# Patient Record
Sex: MALE | Race: BLACK OR AFRICAN AMERICAN | URBAN - METROPOLITAN AREA
[De-identification: names, ages, dates, MRNs, and addresses within clinical notes are randomized per-mention and may not be internally consistent; named-entity substitution may affect disease eponyms.]

---

## 2020-05-18 ENCOUNTER — OFFICE VISIT (OUTPATIENT)
Dept: FAMILY MEDICINE CLINIC | Facility: CLINIC | Age: 55
End: 2020-05-18
Payer: COMMERCIAL

## 2020-05-18 VITALS
RESPIRATION RATE: 17 BRPM | SYSTOLIC BLOOD PRESSURE: 148 MMHG | DIASTOLIC BLOOD PRESSURE: 98 MMHG | WEIGHT: 315 LBS | HEART RATE: 86 BPM | HEIGHT: 74 IN | BODY MASS INDEX: 40.43 KG/M2

## 2020-05-18 DIAGNOSIS — Z00.00 ENCOUNTER FOR PREVENTIVE CARE: ICD-10-CM

## 2020-05-18 DIAGNOSIS — E66.01 MORBID OBESITY WITH BMI OF 40.0-44.9, ADULT (HCC): Primary | ICD-10-CM

## 2020-05-18 DIAGNOSIS — Z12.5 PROSTATE CANCER SCREENING: ICD-10-CM

## 2020-05-18 DIAGNOSIS — I10 ESSENTIAL HYPERTENSION: ICD-10-CM

## 2020-05-18 DIAGNOSIS — R29.818 SUSPECTED SLEEP APNEA: ICD-10-CM

## 2020-05-18 PROCEDURE — 99203 OFFICE O/P NEW LOW 30 MIN: CPT | Performed by: FAMILY MEDICINE

## 2020-05-18 PROCEDURE — 99386 PREV VISIT NEW AGE 40-64: CPT | Performed by: FAMILY MEDICINE

## 2020-05-18 PROCEDURE — 93000 ELECTROCARDIOGRAM COMPLETE: CPT | Performed by: FAMILY MEDICINE

## 2020-05-18 RX ORDER — OLMESARTAN MEDOXOMIL AND HYDROCHLOROTHIAZIDE 40/25 40; 25 MG/1; MG/1
TABLET ORAL
COMMUNITY
Start: 2020-02-06 | End: 2020-05-18

## 2020-05-18 RX ORDER — OLMESARTAN MEDOXOMIL AND HYDROCHLOROTHIAZIDE 40/25 40; 25 MG/1; MG/1
1 TABLET ORAL DAILY
Qty: 90 TABLET | Refills: 3 | Status: SHIPPED | OUTPATIENT
Start: 2020-05-18 | End: 2020-05-18

## 2020-05-18 RX ORDER — OLMESARTAN MEDOXOMIL AND HYDROCHLOROTHIAZIDE 40/25 40; 25 MG/1; MG/1
1 TABLET ORAL DAILY
Qty: 90 TABLET | Refills: 3 | Status: SHIPPED | OUTPATIENT
Start: 2020-05-18 | End: 2021-03-23

## 2020-05-18 NOTE — PROGRESS NOTES
HPI:    Patient ID: Alla Anna is a 53year old male.     Patient is a 58-year-old -American male here to establish care with new physician and here for complete preventive care physical and for status update on any confirmed chronic medical illne TO FREE T4; Future  - URINALYSIS, ROUTINE; Future  - ELECTROCARDIOGRAM, COMPLETE  - CBC WITH DIFFERENTIAL WITH PLATELET  - COMP METABOLIC PANEL (14)  - LIPID PANEL  - TSH W REFLEX TO FREE T4  - URINALYSIS, ROUTINE  - CBC W/ DIFFERENTIAL    2.  Morbid obesit

## 2020-05-19 DIAGNOSIS — E66.01 MORBID OBESITY (HCC): Primary | ICD-10-CM

## 2021-03-23 DIAGNOSIS — I10 ESSENTIAL HYPERTENSION: ICD-10-CM

## 2021-03-24 RX ORDER — OLMESARTAN MEDOXOMIL AND HYDROCHLOROTHIAZIDE 40/25 40; 25 MG/1; MG/1
1 TABLET ORAL DAILY
Qty: 90 TABLET | Refills: 3 | Status: SHIPPED | OUTPATIENT
Start: 2021-03-24

## 2021-04-06 ENCOUNTER — IMMUNIZATION (OUTPATIENT)
Dept: LAB | Facility: HOSPITAL | Age: 56
End: 2021-04-06
Attending: HOSPITALIST
Payer: COMMERCIAL

## 2021-04-06 DIAGNOSIS — Z23 NEED FOR VACCINATION: Primary | ICD-10-CM

## 2021-04-06 PROCEDURE — 0011A SARSCOV2 VAC 100MCG/0.5ML IM: CPT

## 2021-05-04 ENCOUNTER — IMMUNIZATION (OUTPATIENT)
Dept: LAB | Facility: HOSPITAL | Age: 56
End: 2021-05-04
Attending: EMERGENCY MEDICINE
Payer: COMMERCIAL

## 2021-05-04 DIAGNOSIS — Z23 NEED FOR VACCINATION: Primary | ICD-10-CM

## 2021-05-04 PROCEDURE — 0012A SARSCOV2 VAC 100MCG/0.5ML IM: CPT

## 2021-05-24 ENCOUNTER — OFFICE VISIT (OUTPATIENT)
Dept: FAMILY MEDICINE CLINIC | Facility: CLINIC | Age: 56
End: 2021-05-24
Payer: COMMERCIAL

## 2021-05-24 ENCOUNTER — LAB ENCOUNTER (OUTPATIENT)
Dept: LAB | Age: 56
End: 2021-05-24
Attending: FAMILY MEDICINE
Payer: COMMERCIAL

## 2021-05-24 VITALS
HEIGHT: 74 IN | TEMPERATURE: 97 F | SYSTOLIC BLOOD PRESSURE: 169 MMHG | BODY MASS INDEX: 40.43 KG/M2 | HEART RATE: 92 BPM | DIASTOLIC BLOOD PRESSURE: 106 MMHG | WEIGHT: 315 LBS | RESPIRATION RATE: 18 BRPM

## 2021-05-24 DIAGNOSIS — I10 ESSENTIAL HYPERTENSION: ICD-10-CM

## 2021-05-24 DIAGNOSIS — Z00.00 ROUTINE PHYSICAL EXAMINATION: Primary | ICD-10-CM

## 2021-05-24 DIAGNOSIS — E66.01 MORBID OBESITY WITH BMI OF 40.0-44.9, ADULT (HCC): ICD-10-CM

## 2021-05-24 DIAGNOSIS — Z12.11 COLON CANCER SCREENING: ICD-10-CM

## 2021-05-24 DIAGNOSIS — Z00.00 ROUTINE PHYSICAL EXAMINATION: ICD-10-CM

## 2021-05-24 DIAGNOSIS — M67.472 GANGLION CYST OF LEFT FOOT: ICD-10-CM

## 2021-05-24 PROCEDURE — 80053 COMPREHEN METABOLIC PANEL: CPT

## 2021-05-24 PROCEDURE — 3080F DIAST BP >= 90 MM HG: CPT | Performed by: FAMILY MEDICINE

## 2021-05-24 PROCEDURE — 3077F SYST BP >= 140 MM HG: CPT | Performed by: FAMILY MEDICINE

## 2021-05-24 PROCEDURE — 85025 COMPLETE CBC W/AUTO DIFF WBC: CPT

## 2021-05-24 PROCEDURE — 99214 OFFICE O/P EST MOD 30 MIN: CPT | Performed by: FAMILY MEDICINE

## 2021-05-24 PROCEDURE — 84443 ASSAY THYROID STIM HORMONE: CPT

## 2021-05-24 PROCEDURE — 99396 PREV VISIT EST AGE 40-64: CPT | Performed by: FAMILY MEDICINE

## 2021-05-24 PROCEDURE — 81003 URINALYSIS AUTO W/O SCOPE: CPT

## 2021-05-24 PROCEDURE — 3008F BODY MASS INDEX DOCD: CPT | Performed by: FAMILY MEDICINE

## 2021-05-24 PROCEDURE — 80061 LIPID PANEL: CPT

## 2021-05-24 PROCEDURE — 93000 ELECTROCARDIOGRAM COMPLETE: CPT | Performed by: FAMILY MEDICINE

## 2021-05-24 PROCEDURE — 36415 COLL VENOUS BLD VENIPUNCTURE: CPT

## 2021-05-24 NOTE — PROGRESS NOTES
HPI/Subjective:   Patient ID: Alana Funez is a 61year old male.     This patient is a 79-year-old -American male here for complete preventive care physical and for status update on any confirmed chronic medical illnesses and follow up on any previ well exam except for #5.  - LIPID PANEL; Future  - COMP METABOLIC PANEL (14); Future  - CBC WITH DIFFERENTIAL WITH PLATELET;  Future  - TSH W REFLEX TO FREE T4; Future  - URINALYSIS, ROUTINE; Future  - PSA SCREEN; Future  - ELECTROCARDIOGRAM, COMPLETE; Norm

## 2021-06-02 ENCOUNTER — OFFICE VISIT (OUTPATIENT)
Dept: PODIATRY CLINIC | Facility: CLINIC | Age: 56
End: 2021-06-02
Payer: COMMERCIAL

## 2021-06-02 VITALS — WEIGHT: 315 LBS | HEIGHT: 74 IN | BODY MASS INDEX: 40.43 KG/M2

## 2021-06-02 DIAGNOSIS — B35.1 ONYCHOMYCOSIS: ICD-10-CM

## 2021-06-02 DIAGNOSIS — M72.2 PLANTAR FASCIAL FIBROMATOSIS OF LEFT FOOT: Primary | ICD-10-CM

## 2021-06-02 PROCEDURE — 3008F BODY MASS INDEX DOCD: CPT | Performed by: PODIATRIST

## 2021-06-02 PROCEDURE — 99243 OFF/OP CNSLTJ NEW/EST LOW 30: CPT | Performed by: PODIATRIST

## 2021-06-02 NOTE — PROGRESS NOTES
HPI:    Patient ID: Michelle Dia is a 61year old male. This pleasant 50-year-old male presents as a new patient to me on consult from [address] [place] [address]. Patient's primary complaint is a painful lump in the arch of the left foot.   Its been present for the las well-informed         ASSESSMENT/PLAN:   Plantar fascial fibromatosis of left foot  (primary encounter diagnosis)  Onychomycosis    No orders of the defined types were placed in this encounter.       Meds This Visit:  Requested Prescriptions      No prescri

## 2021-06-03 ENCOUNTER — TELEPHONE (OUTPATIENT)
Dept: PODIATRY CLINIC | Facility: CLINIC | Age: 56
End: 2021-06-03

## 2021-06-03 DIAGNOSIS — M72.2 PLANTAR FASCIAL FIBROMATOSIS: Primary | ICD-10-CM

## 2021-06-03 NOTE — TELEPHONE ENCOUNTER
Received surgical case request for partial plantar fasciectomy, left foot. Called patient this date and he is requesting surgery for 6/30/21. This will be scheduled at [facility]. Informed patient I will call him to review pre-op. Instructions once confirmed.

## 2021-06-04 NOTE — TELEPHONE ENCOUNTER
Called patient this date and left message advising him surgery is confirmed for 6/30/21 at [facility] but asking him to please contact me directly to review pre-op. Instructions as well as scheduling his post op visits.

## 2021-06-09 NOTE — TELEPHONE ENCOUNTER
Called patient this date and asked him to call me directly to review pre op instructions and schedule post op visits.   He can reach me directly at [phone]

## 2021-06-15 ENCOUNTER — TELEPHONE (OUTPATIENT)
Dept: PODIATRY CLINIC | Facility: CLINIC | Age: 56
End: 2021-06-15

## 2021-06-15 NOTE — TELEPHONE ENCOUNTER
Called patient again this date and confirmed with him that surgery is scheduled on 6/30/21 at [facility]. Reviewed pre-op. Instructions and patient voiced understanding. Reminded patient to read the instructions I previously sent to him thru My Chart.   Placed

## 2021-06-15 NOTE — TELEPHONE ENCOUNTER
In speaking with patient this date regarding his upcoming foot surgery with Dr. Della Schirmer on 6/30/21, Partial plantar fasciectomy, left foot, he is wondering if he can have just local anesthesia for this procedure. Please advise.

## 2021-06-28 NOTE — TELEPHONE ENCOUNTER
Received notification this date from [facility] that patient has to cancel his 6/30/21 surgery due to the fact that he has to care for a family member. Placed cancellation notice in [employer].   Also, left message on patient's voicemail that I received the cancel

## 2021-12-20 ENCOUNTER — IMMUNIZATION (OUTPATIENT)
Dept: LAB | Facility: HOSPITAL | Age: 56
End: 2021-12-20
Attending: EMERGENCY MEDICINE
Payer: COMMERCIAL

## 2021-12-20 DIAGNOSIS — Z23 NEED FOR VACCINATION: Primary | ICD-10-CM

## 2021-12-20 PROCEDURE — 0064A SARSCOV2 VAC 50MCG/0.25ML IM: CPT

## 2022-04-25 RX ORDER — OLMESARTAN MEDOXOMIL AND HYDROCHLOROTHIAZIDE 40/25 40; 25 MG/1; MG/1
1 TABLET ORAL DAILY
Qty: 90 TABLET | Refills: 0 | Status: SHIPPED | OUTPATIENT
Start: 2022-04-25

## 2022-08-05 ENCOUNTER — OFFICE VISIT (OUTPATIENT)
Dept: FAMILY MEDICINE CLINIC | Facility: CLINIC | Age: 57
End: 2022-08-05
Payer: COMMERCIAL

## 2022-08-05 ENCOUNTER — LAB ENCOUNTER (OUTPATIENT)
Dept: LAB | Age: 57
End: 2022-08-05
Attending: FAMILY MEDICINE
Payer: COMMERCIAL

## 2022-08-05 VITALS
BODY MASS INDEX: 40.43 KG/M2 | OXYGEN SATURATION: 100 % | DIASTOLIC BLOOD PRESSURE: 133 MMHG | HEART RATE: 92 BPM | HEIGHT: 74 IN | WEIGHT: 315 LBS | TEMPERATURE: 98 F | SYSTOLIC BLOOD PRESSURE: 194 MMHG

## 2022-08-05 DIAGNOSIS — Z00.00 ENCOUNTER FOR SCREENING AND PREVENTATIVE CARE: ICD-10-CM

## 2022-08-05 DIAGNOSIS — Z12.11 COLON CANCER SCREENING: ICD-10-CM

## 2022-08-05 DIAGNOSIS — R94.31 ABNORMAL FINDING ON EKG: ICD-10-CM

## 2022-08-05 DIAGNOSIS — I10 SEVERE UNCONTROLLED HYPERTENSION: ICD-10-CM

## 2022-08-05 DIAGNOSIS — I10 PRIMARY HYPERTENSION: ICD-10-CM

## 2022-08-05 DIAGNOSIS — R29.818 SUSPECTED SLEEP APNEA: ICD-10-CM

## 2022-08-05 DIAGNOSIS — E88.09 HYPERPROTEINEMIA: Primary | ICD-10-CM

## 2022-08-05 DIAGNOSIS — Z00.00 ENCOUNTER FOR SCREENING AND PREVENTATIVE CARE: Primary | ICD-10-CM

## 2022-08-05 DIAGNOSIS — E66.01 MORBID OBESITY WITH BMI OF 40.0-44.9, ADULT (HCC): ICD-10-CM

## 2022-08-05 LAB
ALBUMIN SERPL-MCNC: 3.2 G/DL (ref 3.4–5)
ALBUMIN/GLOB SERPL: 0.6 {RATIO} (ref 1–2)
ALP LIVER SERPL-CCNC: 103 U/L
ALT SERPL-CCNC: 35 U/L
ANION GAP SERPL CALC-SCNC: 8 MMOL/L (ref 0–18)
AST SERPL-CCNC: 28 U/L (ref 15–37)
BASOPHILS # BLD AUTO: 0.01 X10(3) UL (ref 0–0.2)
BASOPHILS NFR BLD AUTO: 0.2 %
BILIRUB SERPL-MCNC: 0.4 MG/DL (ref 0.1–2)
BILIRUB UR QL: NEGATIVE
BUN BLD-MCNC: 14 MG/DL (ref 7–18)
BUN/CREAT SERPL: 11.1 (ref 10–20)
CALCIUM BLD-MCNC: 8.7 MG/DL (ref 8.5–10.1)
CHLORIDE SERPL-SCNC: 106 MMOL/L (ref 98–112)
CHOLEST SERPL-MCNC: 158 MG/DL (ref ?–200)
CLARITY UR: CLEAR
CO2 SERPL-SCNC: 29 MMOL/L (ref 21–32)
COLOR UR: YELLOW
COMPLEXED PSA SERPL-MCNC: 1.42 NG/ML (ref ?–4)
CREAT BLD-MCNC: 1.26 MG/DL
DEPRECATED RDW RBC AUTO: 47.8 FL (ref 35.1–46.3)
EOSINOPHIL # BLD AUTO: 0.04 X10(3) UL (ref 0–0.7)
EOSINOPHIL NFR BLD AUTO: 1 %
ERYTHROCYTE [DISTWIDTH] IN BLOOD BY AUTOMATED COUNT: 14.8 % (ref 11–15)
FASTING PATIENT LIPID ANSWER: NO
FASTING STATUS PATIENT QL REPORTED: NO
GFR SERPLBLD BASED ON 1.73 SQ M-ARVRAT: 66 ML/MIN/1.73M2 (ref 60–?)
GLOBULIN PLAS-MCNC: 5.1 G/DL (ref 2.8–4.4)
GLUCOSE BLD-MCNC: 72 MG/DL (ref 70–99)
GLUCOSE UR-MCNC: NEGATIVE MG/DL
HCT VFR BLD AUTO: 44.9 %
HDLC SERPL-MCNC: 36 MG/DL (ref 40–59)
HGB BLD-MCNC: 13.8 G/DL
HGB UR QL STRIP.AUTO: NEGATIVE
IMM GRANULOCYTES # BLD AUTO: 0.01 X10(3) UL (ref 0–1)
IMM GRANULOCYTES NFR BLD: 0.2 %
KETONES UR-MCNC: NEGATIVE MG/DL
LDLC SERPL CALC-MCNC: 105 MG/DL (ref ?–100)
LEUKOCYTE ESTERASE UR QL STRIP.AUTO: NEGATIVE
LYMPHOCYTES # BLD AUTO: 1.4 X10(3) UL (ref 1–4)
LYMPHOCYTES NFR BLD AUTO: 34.8 %
MCH RBC QN AUTO: 27.1 PG (ref 26–34)
MCHC RBC AUTO-ENTMCNC: 30.7 G/DL (ref 31–37)
MCV RBC AUTO: 88 FL
MONOCYTES # BLD AUTO: 0.53 X10(3) UL (ref 0.1–1)
MONOCYTES NFR BLD AUTO: 13.2 %
NEUTROPHILS # BLD AUTO: 2.03 X10 (3) UL (ref 1.5–7.7)
NEUTROPHILS # BLD AUTO: 2.03 X10(3) UL (ref 1.5–7.7)
NEUTROPHILS NFR BLD AUTO: 50.6 %
NITRITE UR QL STRIP.AUTO: NEGATIVE
NONHDLC SERPL-MCNC: 122 MG/DL (ref ?–130)
OSMOLALITY SERPL CALC.SUM OF ELEC: 295 MOSM/KG (ref 275–295)
PH UR: 6.5 [PH] (ref 5–8)
PLATELET # BLD AUTO: 180 10(3)UL (ref 150–450)
POTASSIUM SERPL-SCNC: 3.3 MMOL/L (ref 3.5–5.1)
PROT SERPL-MCNC: 8.3 G/DL (ref 6.4–8.2)
RBC # BLD AUTO: 5.1 X10(6)UL
SODIUM SERPL-SCNC: 143 MMOL/L (ref 136–145)
SP GR UR STRIP: 1.02 (ref 1–1.03)
TRIGL SERPL-MCNC: 87 MG/DL (ref 30–149)
TSI SER-ACNC: 0.95 MIU/ML (ref 0.36–3.74)
UROBILINOGEN UR STRIP-ACNC: 0.2
VLDLC SERPL CALC-MCNC: 15 MG/DL (ref 0–30)
WBC # BLD AUTO: 4 X10(3) UL (ref 4–11)

## 2022-08-05 PROCEDURE — 80061 LIPID PANEL: CPT

## 2022-08-05 PROCEDURE — 85025 COMPLETE CBC W/AUTO DIFF WBC: CPT

## 2022-08-05 PROCEDURE — 80053 COMPREHEN METABOLIC PANEL: CPT

## 2022-08-05 PROCEDURE — 3077F SYST BP >= 140 MM HG: CPT | Performed by: FAMILY MEDICINE

## 2022-08-05 PROCEDURE — 99212 OFFICE O/P EST SF 10 MIN: CPT | Performed by: FAMILY MEDICINE

## 2022-08-05 PROCEDURE — 3080F DIAST BP >= 90 MM HG: CPT | Performed by: FAMILY MEDICINE

## 2022-08-05 PROCEDURE — 3008F BODY MASS INDEX DOCD: CPT | Performed by: FAMILY MEDICINE

## 2022-08-05 PROCEDURE — 36415 COLL VENOUS BLD VENIPUNCTURE: CPT

## 2022-08-05 PROCEDURE — 99396 PREV VISIT EST AGE 40-64: CPT | Performed by: FAMILY MEDICINE

## 2022-08-05 PROCEDURE — 81015 MICROSCOPIC EXAM OF URINE: CPT

## 2022-08-05 PROCEDURE — 81001 URINALYSIS AUTO W/SCOPE: CPT

## 2022-08-05 PROCEDURE — 84443 ASSAY THYROID STIM HORMONE: CPT

## 2022-08-08 DIAGNOSIS — I10 ESSENTIAL HYPERTENSION: ICD-10-CM

## 2022-08-08 RX ORDER — OLMESARTAN MEDOXOMIL AND HYDROCHLOROTHIAZIDE 40/25 40; 25 MG/1; MG/1
TABLET ORAL
Qty: 90 TABLET | Refills: 0 | Status: SHIPPED | OUTPATIENT
Start: 2022-08-08

## 2022-08-09 ENCOUNTER — LAB ENCOUNTER (OUTPATIENT)
Dept: LAB | Age: 57
End: 2022-08-09
Attending: FAMILY MEDICINE
Payer: COMMERCIAL

## 2022-08-09 DIAGNOSIS — E88.09 HYPERPROTEINEMIA: ICD-10-CM

## 2022-08-09 PROCEDURE — 84165 PROTEIN E-PHORESIS SERUM: CPT

## 2022-08-09 PROCEDURE — 36415 COLL VENOUS BLD VENIPUNCTURE: CPT

## 2022-08-12 LAB
ALBUMIN SERPL ELPH-MCNC: 3.9 G/DL (ref 3.75–5.21)
ALBUMIN/GLOB SERPL: 0.93 {RATIO} (ref 1–2)
ALPHA1 GLOB SERPL ELPH-MCNC: 0.35 G/DL (ref 0.19–0.46)
ALPHA2 GLOB SERPL ELPH-MCNC: 0.87 G/DL (ref 0.48–1.05)
B-GLOBULIN SERPL ELPH-MCNC: 0.75 G/DL (ref 0.68–1.23)
GAMMA GLOB SERPL ELPH-MCNC: 2.24 G/DL (ref 0.62–1.7)
PROT SERPL-MCNC: 8.1 G/DL (ref 6.4–8.2)

## 2022-08-20 DIAGNOSIS — E88.09 HYPERPROTEINEMIA: Primary | ICD-10-CM

## 2022-10-27 DIAGNOSIS — I10 ESSENTIAL HYPERTENSION: ICD-10-CM

## 2022-10-29 RX ORDER — OLMESARTAN MEDOXOMIL AND HYDROCHLOROTHIAZIDE 40/25 40; 25 MG/1; MG/1
1 TABLET ORAL DAILY
Qty: 90 TABLET | Refills: 0 | Status: SHIPPED | OUTPATIENT
Start: 2022-10-29

## 2022-10-29 NOTE — TELEPHONE ENCOUNTER
Please review. Protocol failed / No protocol.   Requested Prescriptions   Pending Prescriptions Disp Refills    OLMESARTAN MEDOXOMIL-HCTZ 40-25 MG Oral Tab [Pharmacy Med Name: Kerri Roberts HCT TAB 40-25MG] 90 tablet 0     Sig: TAKE 1 TABLET DAILY       Hypertensive Medications Protocol Failed - 10/27/2022  2:19 PM        Failed - Last BP reading less than 140/90     BP Readings from Last 1 Encounters:  08/05/22 : (!) 194/133              Passed - In person appointment in the past 12 or next 3 months     Recent Outpatient Visits              2 months ago Encounter for screening and preventative care    [facility], [facility] , Maxx Caraballo, DO    Office Visit    1 year ago Plantar fascial fibromatosis of left foot    [facility], [address], Pablo Myers, MANJEET    Office Visit    1 year ago Routine physical examination    [facility], [facility] , Maxx Caraballo, DO    Office Visit    2 years ago Morbid obesity with BMI of 40.0-44.9, adult [facility])    [facility], [facility] , Maxx Caraballo, DO    Office Visit                      Passed - CMP or BMP in past 6 months     Recent Results (from the past 4392 hour(s))   COMP METABOLIC PANEL (14)    Collection Time: 08/05/22  2:06 PM   Result Value Ref Range    Glucose 72 70 - 99 mg/dL    Sodium 143 136 - 145 mmol/L    Potassium 3.3 (L) 3.5 - 5.1 mmol/L    Chloride 106 98 - 112 mmol/L    CO2 29.0 21.0 - 32.0 mmol/L    Anion Gap 8 0 - 18 mmol/L    BUN 14 7 - 18 mg/dL    Creatinine 1.26 0.70 - 1.30 mg/dL    BUN/CREA Ratio 11.1 10.0 - 20.0    Calcium, Total 8.7 8.5 - 10.1 mg/dL    Calculated Osmolality 295 275 - 295 mOsm/kg    eGFR-Cr 66 >=60 mL/min/1.73m2    ALT 35 16 - 61 U/L    AST 28 15 - 37 U/L    Alkaline Phosphatase 103 45 - 117 U/L    Bilirubin, Total 0.4 0.1 - 2.0 mg/dL    Total Protein 8.3 (H) 6.4 - 8.2 g/dL    Albumin 3.2 (L) 3.4 - 5.0 g/dL    Globulin  5.1 (H) 2.8 - 4.4 g/dL A/G Ratio 0.6 (L) 1.0 - 2.0    Patient Fasting for CMP? No      *Note: Due to a large number of results and/or encounters for the requested time period, some results have not been displayed. A complete set of results can be found in Results Review.                Passed - In person appointment or virtual visit in the past 6 months     Recent Outpatient Visits              2 months ago Encounter for screening and preventative care    [facility], [facility], Rashmi Haas Pietro Linger,     Office Visit    1 year ago Plantar fascial fibromatosis of left foot    [facility], [address], Panda Myers DPM    Office Visit    1 year ago Routine physical examination    [facility], Rashmi Haas Pietro Linger,     Office Visit    2 years ago Morbid obesity with BMI of 40.0-44.9, adult [facility])    [facility], Sanju R[address], Scott Caraballo,     Office Visit                      Passed - EGFRCR or GFRAA > 50     GFR Evaluation  EGFRCR: 66 , resulted on 8/5/2022               Recent Outpatient Visits              2 months ago Encounter for screening and preventative care    [facility], [facility], Rashmi Haas Pietro Linger,     Office Visit    1 year ago Plantar fascial fibromatosis of left foot    [facility], [address], Panda Myers DPM    Office Visit    1 year ago Routine physical examination    [facility] [place], Pietro Linger, DO    Office Visit    2 years ago Morbid obesity with BMI of 40.0-44.9, [facility])    [facility], [address], Scott Caraballo [place]    Office Visit

## 2023-01-27 DIAGNOSIS — I10 ESSENTIAL HYPERTENSION: ICD-10-CM

## 2023-01-27 RX ORDER — OLMESARTAN MEDOXOMIL AND HYDROCHLOROTHIAZIDE 40/25 40; 25 MG/1; MG/1
TABLET ORAL
Qty: 90 TABLET | Refills: 0 | Status: SHIPPED | OUTPATIENT
Start: 2023-01-27

## 2023-03-28 ENCOUNTER — OFFICE VISIT (OUTPATIENT)
Dept: FAMILY MEDICINE CLINIC | Facility: CLINIC | Age: 58
End: 2023-03-28

## 2023-03-28 VITALS
BODY MASS INDEX: 40.43 KG/M2 | WEIGHT: 315 LBS | SYSTOLIC BLOOD PRESSURE: 172 MMHG | HEIGHT: 74 IN | HEART RATE: 89 BPM | TEMPERATURE: 98 F | DIASTOLIC BLOOD PRESSURE: 115 MMHG

## 2023-03-28 DIAGNOSIS — R29.818 SUSPECTED SLEEP APNEA: ICD-10-CM

## 2023-03-28 DIAGNOSIS — J04.0 LARYNGITIS: ICD-10-CM

## 2023-03-28 DIAGNOSIS — Z12.11 COLON CANCER SCREENING: Primary | ICD-10-CM

## 2023-03-28 DIAGNOSIS — R09.82 POST-NASAL DRIP: ICD-10-CM

## 2023-03-28 DIAGNOSIS — I10 SEVERE UNCONTROLLED HYPERTENSION: ICD-10-CM

## 2023-03-28 PROCEDURE — 99214 OFFICE O/P EST MOD 30 MIN: CPT | Performed by: FAMILY MEDICINE

## 2023-03-28 PROCEDURE — 3080F DIAST BP >= 90 MM HG: CPT | Performed by: FAMILY MEDICINE

## 2023-03-28 PROCEDURE — 3008F BODY MASS INDEX DOCD: CPT | Performed by: FAMILY MEDICINE

## 2023-03-28 PROCEDURE — 3077F SYST BP >= 140 MM HG: CPT | Performed by: FAMILY MEDICINE

## 2023-03-28 RX ORDER — LEVOCETIRIZINE DIHYDROCHLORIDE 5 MG/1
5 TABLET, FILM COATED ORAL EVERY EVENING
Qty: 30 TABLET | Refills: 1 | Status: SHIPPED | OUTPATIENT
Start: 2023-03-28

## 2023-03-28 RX ORDER — AMLODIPINE BESYLATE 5 MG/1
5 TABLET ORAL DAILY
Qty: 90 TABLET | Refills: 1 | Status: SHIPPED | OUTPATIENT
Start: 2023-03-28

## 2023-03-28 NOTE — PATIENT INSTRUCTIONS
Renal artery US with doppler study. Home sleep study ordered. Patient would benefit from weight loss. Adding Amlodipine 2.5 mg to antihypertensive therapy. Continue with warm tea. Voice rest is paramount when it can happen. If hoarseness persists then we will refer to ENT. Patient encouraged once again and a new order has been placed on the chart for home sleep study. We will also do an ultrasound of the patient's kidney arteries to make for sure that there is no renal artery stenosis contributing to his blood pressure.

## 2023-05-02 ENCOUNTER — OFFICE VISIT (OUTPATIENT)
Dept: FAMILY MEDICINE CLINIC | Facility: CLINIC | Age: 58
End: 2023-05-02

## 2023-05-02 VITALS
WEIGHT: 315 LBS | DIASTOLIC BLOOD PRESSURE: 99 MMHG | SYSTOLIC BLOOD PRESSURE: 151 MMHG | BODY MASS INDEX: 40.43 KG/M2 | HEART RATE: 76 BPM | HEIGHT: 74 IN | TEMPERATURE: 98 F

## 2023-05-02 DIAGNOSIS — R01.1 HEART MURMUR: ICD-10-CM

## 2023-05-02 DIAGNOSIS — E66.01 MORBID OBESITY WITH BMI OF 40.0-44.9, ADULT (HCC): ICD-10-CM

## 2023-05-02 DIAGNOSIS — J04.0 LARYNGITIS: ICD-10-CM

## 2023-05-02 DIAGNOSIS — I10 ESSENTIAL HYPERTENSION: Primary | ICD-10-CM

## 2023-05-02 DIAGNOSIS — R09.82 POST-NASAL DRIP: ICD-10-CM

## 2023-05-02 PROCEDURE — 99214 OFFICE O/P EST MOD 30 MIN: CPT | Performed by: FAMILY MEDICINE

## 2023-05-02 PROCEDURE — 3008F BODY MASS INDEX DOCD: CPT | Performed by: FAMILY MEDICINE

## 2023-05-02 PROCEDURE — 3077F SYST BP >= 140 MM HG: CPT | Performed by: FAMILY MEDICINE

## 2023-05-02 PROCEDURE — 3080F DIAST BP >= 90 MM HG: CPT | Performed by: FAMILY MEDICINE

## 2023-05-02 RX ORDER — AMLODIPINE BESYLATE 5 MG/1
5 TABLET ORAL DAILY
Qty: 90 TABLET | Refills: 1 | Status: SHIPPED | OUTPATIENT
Start: 2023-05-02

## 2023-05-02 RX ORDER — LEVOCETIRIZINE DIHYDROCHLORIDE 5 MG/1
5 TABLET, FILM COATED ORAL EVERY EVENING
Qty: 90 TABLET | Refills: 1 | Status: SHIPPED | OUTPATIENT
Start: 2023-05-02

## 2023-05-02 RX ORDER — AMLODIPINE BESYLATE 10 MG/1
10 TABLET ORAL DAILY
Qty: 90 TABLET | Refills: 1 | Status: SHIPPED | OUTPATIENT
Start: 2023-05-02

## 2023-05-02 NOTE — PATIENT INSTRUCTIONS
Medication reviewed and renewed where needed and appropriate. Recommend weight loss via daily exercising and consistent healthy dietary changes. Encouraged safe physical fitness and daily physical activity daily. Comply with medications. Monitor blood pressures and record at home. Limit salt intake. Echo ordered. Amlodipine 10 mg orally daily.

## 2023-05-16 DIAGNOSIS — I10 ESSENTIAL HYPERTENSION: ICD-10-CM

## 2023-05-17 RX ORDER — OLMESARTAN MEDOXOMIL AND HYDROCHLOROTHIAZIDE 40/25 40; 25 MG/1; MG/1
1 TABLET ORAL EVERY MORNING
Qty: 90 TABLET | Refills: 3 | Status: SHIPPED | OUTPATIENT
Start: 2023-05-17

## 2023-05-17 NOTE — TELEPHONE ENCOUNTER
Protocol failed or has No Protocol, please review  Requested Prescriptions   Pending Prescriptions Disp Refills    OLMESARTAN MEDOXOMIL-HCTZ 40-25 MG Oral Tab [Pharmacy Med Name: Brant Blanco HCT TAB 40-25MG] 90 tablet 0     Sig: TAKE 1 TABLET EVERY MORNING       Hypertensive Medications Protocol Failed - 5/16/2023  4:17 PM        Failed - Last BP reading less than 140/90     BP Readings from Last 1 Encounters:  05/02/23 : (!) 151/99                Failed - CMP or BMP in past 6 months     No results found for this or any previous visit (from the past 4392 hour(s)).               Passed - In person appointment in the past 12 or next 3 months     Recent Outpatient Visits              2 weeks ago Essential hypertension    [address], [facility] , [place], [address] [place]    Office Visit    1 month ago Colon cancer screening    Mary Jane Machado [place], [address], DO    Office Visit    9 months ago Encounter for screening and preventative care    Rashmi Rae, [address], DO    Office Visit    1 year ago Plantar fascial fibromatosis of left foot    [facility], [address], [place] Germaine Molina, MANJEET    Office Visit    1 year ago Routine physical examination    Rashmi Rae, [address] - In person appointment or virtual visit in the past 6 months     Recent Outpatient Visits              2 weeks ago Essential hypertension    [address], [facility], [place], [address], DO    Office Visit    1 month ago Colon cancer screening    Rashmi Rae, [address], DO    Office Visit    9 months ago Encounter for screening and preventative care    Rashmi Rae, Ramon CUBA, [place] Office Visit    1 year ago Plantar fascial fibromatosis of left foot    [facility], [address], Alyssia MyersCorrell, [place]    Office Visit    1 year ago Routine physical examination    Melida Scott, [place], [place]    Office Visit                      Passed - EGFRCR or GFRAA > 50     GFR Evaluation  EGFRCR: 66 , resulted on 8/5/2022                 Recent Outpatient Visits              2 weeks ago Essential hypertension    [address], [facility] [address], [place], [place], DO    Office Visit    1 month ago Colon cancer screening    Melida Scott, [place], [place], DO    Office Visit    9 months ago Encounter for screening and preventative care    Melida Scott, [place], [place], DO    Office Visit    1 year ago Plantar fascial fibromatosis of left foot    Melida Scott, Kala Molina, Alyssia Ross, DP    Office Visit    1 year ago Routine physical examination    Melida Scott, [place], [place], [place]    Office Visit

## 2023-10-08 DIAGNOSIS — I10 ESSENTIAL HYPERTENSION: ICD-10-CM

## 2023-10-10 RX ORDER — OLMESARTAN MEDOXOMIL AND HYDROCHLOROTHIAZIDE 40/25 40; 25 MG/1; MG/1
1 TABLET ORAL EVERY MORNING
Qty: 30 TABLET | Refills: 0 | Status: SHIPPED
Start: 2023-10-10

## 2023-10-10 NOTE — TELEPHONE ENCOUNTER
Please review. Protocol Failed or has no Protocol. Abnormal lab. Needs labs too. Please reply to claribel: EM RN TRIAGE      Requested Prescriptions   Pending Prescriptions Disp Refills    OLMESARTAN MEDOXOMIL-HCTZ 40-25 MG Oral Tab [Pharmacy Med Name: OLMESARTAN MEDOX/HCTZ 40-25MG TAB] 30 tablet 0     Sig: Take 1 tablet by mouth every morning. Hypertensive Medications Protocol Failed - 10/8/2023  4:30 PM        Failed - Last BP reading less than 140/90     BP Readings from Last 1 Encounters:  05/02/23 : (!) 151/99              Failed - CMP or BMP in past 6 months     No results found for this or any previous visit (from the past 4392 hour(s)).             Failed - EGFRCR or GFRAA > 50     GFR Evaluation            Passed - In person appointment in the past 12 or next 3 months     Recent Outpatient Visits              5 months ago Essential hypertension    Sanju Mack [place], Rosemary Bravo,     Office Visit    6 months ago Colon cancer screening    Rashmi Be Rosemary Bravo,     Office Visit    1 year ago Encounter for screening and preventative care    Rashmi Be Rosemary Bravo,     Office Visit    2 years ago Plantar fascial fibromatosis of left foot    Kala Be [place], MANJEET    Office Visit    2 years ago Routine physical examination    Rashmi Be, [facility] - In person appointment or virtual visit in the past 6 months     Recent Outpatient Visits              5 months ago Essential hypertension    Sanju Mack [place], Rosemary Bravo,     Office Visit    6 months ago Colon cancer screening    Sanju Mack [place], Rosemary Bravo, [place]    Office Visit    1 year ago Encounter for screening and preventative care    [facility]ashmi Person [facility]    Office Visit    2 years ago Plantar fascial fibromatosis of left foot    Kala Person Loyde Banana, DPM    Office Visit    2 years ago Routine physical examination    Rashmi Person Edda Koh [place]    Office Visit

## 2023-10-20 DIAGNOSIS — I10 ESSENTIAL HYPERTENSION: ICD-10-CM

## 2023-10-20 DIAGNOSIS — R01.1 HEART MURMUR: ICD-10-CM

## 2023-10-22 RX ORDER — AMLODIPINE BESYLATE 10 MG/1
10 TABLET ORAL DAILY
Qty: 90 TABLET | Refills: 1 | Status: SHIPPED | OUTPATIENT
Start: 2023-10-22

## 2023-10-22 NOTE — TELEPHONE ENCOUNTER
Please review. Protocol failed / No Protocol. Requested Prescriptions   Pending Prescriptions Disp Refills    amLODIPine 10 MG Oral Tab 90 tablet 1     Sig: Take 1 tablet (10 mg total) by mouth daily. Hypertensive Medications Protocol Failed - 10/20/2023 12:36 PM        Failed - Last BP reading less than 140/90     BP Readings from Last 1 Encounters:  05/02/23 : (!) 151/99              Failed - CMP or BMP in past 6 months     No results found for this or any previous visit (from the past 4392 hour(s)).             Failed - EGFRCR or GFRAA > 50     GFR Evaluation            Passed - In person appointment in the past 12 or next 3 months     Recent Outpatient Visits              5 months ago Essential hypertension    [address], [address], [facility] Ani, DO    Office Visit    6 months ago Colon cancer screening    [address] [place] [address], [facility] Ani, DO    Office Visit    1 year ago Encounter for screening and preventative care    [address] [place] [address], Danni Ani, DO    Office Visit    2 years ago Plantar fascial fibromatosis of left foot    [facility], [address], [facility], [facility]    Office Visit    2 years ago Routine physical examination    [address] [place] [address], [address] - In person appointment or virtual visit in the past 6 months     Recent Outpatient Visits              5 months ago Essential hypertension    [address], [address], [place], [place]    Office Visit    6 months ago Colon cancer screening    [address], [address], [place], [place]    Office Visit    1 year ago Encounter for screening and preventative care    [address] [place] [address], Ramon CUBA DO    Office Visit    2 years ago Plantar fascial fibromatosis of left foot    [facility], [address], [place], [place]    Office Visit    2 years ago Routine physical examination    [address] [place] [address]    Office Visit

## 2023-12-24 DIAGNOSIS — I10 ESSENTIAL HYPERTENSION: ICD-10-CM

## 2023-12-24 DIAGNOSIS — R01.1 HEART MURMUR: ICD-10-CM

## 2023-12-26 RX ORDER — AMLODIPINE BESYLATE 10 MG/1
10 TABLET ORAL DAILY
Qty: 90 TABLET | Refills: 1 | Status: SHIPPED | OUTPATIENT
Start: 2023-12-26

## 2023-12-26 NOTE — TELEPHONE ENCOUNTER
Please review; protocol failed. No future labs noted   No future appointments. Requested Prescriptions   Pending Prescriptions Disp Refills    AMLODIPINE 10 MG Oral Tab [Pharmacy Med Name: AMLODIPINE BESYLATE 10MG TABLETS] 90 tablet 1     Sig: TAKE 1 TABLET(10 MG) BY MOUTH DAILY       Hypertensive Medications Protocol Failed - 12/24/2023  4:40 AM        Failed - Last BP reading less than 140/90     BP Readings from Last 1 Encounters:   05/02/23 (!) 151/99               Failed - CMP or BMP in past 6 months     No results found for this or any previous visit (from the past 4392 hour(s)).             Failed - In person appointment or virtual visit in the past 6 months     Recent Outpatient Visits              7 months ago Essential hypertension    [place]    Office Visit    9 months ago Colon cancer screening    Rashmi Funez Bertha Musca,     Office Visit    1 year ago Encounter for screening and preventative care    Rashmi Funez Bertha Musca,     Office Visit    2 years ago Plantar fascial fibromatosis of left foot    Kala Funez Larri Denmark, DPM    Office Visit    2 years ago Routine physical examination    Rashmi Funez Bertha Musca, DO    Office Visit                      Failed - EGFRCR or GFRAA > 50     GFR Evaluation            Passed - In person appointment in the past 12 or next 3 months     Recent Outpatient Visits              7 months ago Essential hypertension    [address], [facility], [place], Sheba Clinton,     Office Visit    9 months ago Colon cancer screening    [address] [facility], Sheba Clinton, DO    Office Visit    1 year ago Encounter for screening and preventative care    Rashmi Gottlieb Marty Brochure, [place]    Office Visit    2 years ago Plantar fascial fibromatosis of left foot    [facility], [address], Rivera Myers, MANJEET    Office Visit    2 years ago Routine physical examination    Rashmi Gottlieb Marty Brochure, DO    Office Visit                           Recent Outpatient Visits              7 months ago Essential hypertension    Rashmi Gottlieb Marty Brochure,     Office Visit    9 months ago Colon cancer screening    Rashmi Gottlieb Marty Brochure, DO    Office Visit    1 year ago Encounter for screening and preventative care    Rashmi Gottlieb Marty Brochure,     Office Visit    2 years ago Plantar fascial fibromatosis of left foot    Kala Gottlieb Hinton Forester, DPYAMILETH    Office Visit    2 years ago Routine physical examination    [facility] Gottlieb, [facility]    Office Visit

## 2024-06-11 DIAGNOSIS — R01.1 HEART MURMUR: ICD-10-CM

## 2024-06-11 DIAGNOSIS — I10 ESSENTIAL HYPERTENSION: ICD-10-CM

## 2024-06-14 RX ORDER — AMLODIPINE BESYLATE 10 MG/1
10 TABLET ORAL DAILY
Qty: 90 TABLET | Refills: 0 | Status: SHIPPED | OUTPATIENT
Start: 2024-06-14

## 2024-06-15 NOTE — TELEPHONE ENCOUNTER
Please review. Protocol Failed or has No Protocol.    Message sent to schedule appointment    Requested Prescriptions   Pending Prescriptions Disp Refills    amLODIPine 10 MG Oral Tab 90 tablet 1     Sig: Take 1 tablet (10 mg total) by mouth daily.       Hypertension Medications Protocol Failed - 6/11/2024  5:12 PM        Failed - CMP or BMP in past 12 months        Failed - Last BP reading less than 140/90     BP Readings from Last 1 Encounters:   05/02/23 (!) 151/99               Failed - In person appointment or virtual visit in the past 12 mos or appointment in next 3 mos     Recent Outpatient Visits              1 year ago Essential hypertension    [facility], [facility] Ramon Keating, DO    Office Visit    1 year ago Colon cancer screening    [facility] Ramon Keating, DO    Office Visit    1 year ago Encounter for screening and preventative care    [facility] Ramon Keating, DO    Office Visit    3 years ago Plantar fascial fibromatosis of left foot    [facility] [place], [facility]    Office Visit    3 years ago Routine physical examination    [facility] [place]    Office Visit                      Failed - EGFRCR or GFRAA > 50     GFR Evaluation                 Recent Outpatient Visits              1 year ago Essential hypertension    [facility] Ramon Schmidt, DO    Office Visit    1 year ago Colon cancer screening    [facility] Ramon Keating, DO    Office Visit    1 year ago Encounter for screening and preventative care    [facility] Ramon Keating, DO    Office Visit    3 years ago Plantar fascial fibromatosis of left foot     [facility], [facility], Jacob Myers, MANJEET    Office Visit    3 years ago Routine physical examination    [facility], [facility] Ramon Keating, DO    Office Visit

## 2024-06-21 ENCOUNTER — OFFICE VISIT (OUTPATIENT)
Dept: FAMILY MEDICINE CLINIC | Facility: CLINIC | Age: 59
End: 2024-06-21

## 2024-06-21 VITALS
TEMPERATURE: 98 F | DIASTOLIC BLOOD PRESSURE: 96 MMHG | WEIGHT: 315 LBS | HEART RATE: 84 BPM | OXYGEN SATURATION: 97 % | BODY MASS INDEX: 43 KG/M2 | RESPIRATION RATE: 18 BRPM | SYSTOLIC BLOOD PRESSURE: 154 MMHG

## 2024-06-21 DIAGNOSIS — R01.1 HEART MURMUR, SYSTOLIC: ICD-10-CM

## 2024-06-21 DIAGNOSIS — I10 SEVERE UNCONTROLLED HYPERTENSION: ICD-10-CM

## 2024-06-21 DIAGNOSIS — E66.01 CLASS 3 SEVERE OBESITY WITH SERIOUS COMORBIDITY AND BODY MASS INDEX (BMI) OF 40.0 TO 44.9 IN ADULT, UNSPECIFIED OBESITY TYPE (HCC): ICD-10-CM

## 2024-06-21 DIAGNOSIS — Z28.21 TETANUS, DIPHTHERIA, AND ACELLULAR PERTUSSIS (TDAP) VACCINATION DECLINED: ICD-10-CM

## 2024-06-21 DIAGNOSIS — E88.09 HYPERPROTEINEMIA: Primary | ICD-10-CM

## 2024-06-21 DIAGNOSIS — Z00.00 ROUTINE PHYSICAL EXAMINATION: Primary | ICD-10-CM

## 2024-06-21 DIAGNOSIS — Z28.21 HERPES ZOSTER VACCINATION DECLINED: ICD-10-CM

## 2024-06-21 DIAGNOSIS — Z12.11 COLON CANCER SCREENING: ICD-10-CM

## 2024-06-21 LAB
ALBUMIN SERPL-MCNC: 4.3 G/DL (ref 3.2–4.8)
ALBUMIN/GLOB SERPL: 1 {RATIO} (ref 1–2)
ALP LIVER SERPL-CCNC: 104 U/L
ALT SERPL-CCNC: 24 U/L
ANION GAP SERPL CALC-SCNC: 8 MMOL/L (ref 0–18)
AST SERPL-CCNC: 33 U/L (ref ?–34)
BILIRUB SERPL-MCNC: 0.4 MG/DL (ref 0.2–1.1)
BILIRUB UR QL: NEGATIVE
BUN BLD-MCNC: 13 MG/DL (ref 9–23)
BUN/CREAT SERPL: 11.2 (ref 10–20)
CALCIUM BLD-MCNC: 9.7 MG/DL (ref 8.7–10.4)
CHLORIDE SERPL-SCNC: 105 MMOL/L (ref 98–112)
CHOLEST SERPL-MCNC: 150 MG/DL (ref ?–200)
CLARITY UR: CLEAR
CO2 SERPL-SCNC: 29 MMOL/L (ref 21–32)
COLOR UR: YELLOW
COMPLEXED PSA SERPL-MCNC: 0.96 NG/ML (ref ?–4)
CREAT BLD-MCNC: 1.16 MG/DL
EGFRCR SERPLBLD CKD-EPI 2021: 72 ML/MIN/1.73M2 (ref 60–?)
FASTING PATIENT LIPID ANSWER: NO
FASTING STATUS PATIENT QL REPORTED: NO
GLOBULIN PLAS-MCNC: 4.1 G/DL (ref 2–3.5)
GLUCOSE BLD-MCNC: 74 MG/DL (ref 70–99)
GLUCOSE UR-MCNC: NORMAL MG/DL
HDLC SERPL-MCNC: 34 MG/DL (ref 40–59)
HGB UR QL STRIP.AUTO: NEGATIVE
KETONES UR-MCNC: NEGATIVE MG/DL
LDLC SERPL CALC-MCNC: 100 MG/DL (ref ?–100)
LEUKOCYTE ESTERASE UR QL STRIP.AUTO: NEGATIVE
NITRITE UR QL STRIP.AUTO: NEGATIVE
NONHDLC SERPL-MCNC: 116 MG/DL (ref ?–130)
OSMOLALITY SERPL CALC.SUM OF ELEC: 293 MOSM/KG (ref 275–295)
PH UR: 5.5 [PH] (ref 5–8)
POTASSIUM SERPL-SCNC: 3.8 MMOL/L (ref 3.5–5.1)
PROT SERPL-MCNC: 8.4 G/DL (ref 5.7–8.2)
PROT UR-MCNC: NEGATIVE MG/DL
SODIUM SERPL-SCNC: 142 MMOL/L (ref 136–145)
SP GR UR STRIP: 1.02 (ref 1–1.03)
TRIGL SERPL-MCNC: 83 MG/DL (ref 30–149)
TSI SER-ACNC: 0.87 MIU/ML (ref 0.55–4.78)
UROBILINOGEN UR STRIP-ACNC: NORMAL
VLDLC SERPL CALC-MCNC: 14 MG/DL (ref 0–30)

## 2024-06-21 PROCEDURE — 3077F SYST BP >= 140 MM HG: CPT | Performed by: FAMILY MEDICINE

## 2024-06-21 PROCEDURE — 99396 PREV VISIT EST AGE 40-64: CPT | Performed by: FAMILY MEDICINE

## 2024-06-21 PROCEDURE — 3080F DIAST BP >= 90 MM HG: CPT | Performed by: FAMILY MEDICINE

## 2024-06-21 PROCEDURE — 99213 OFFICE O/P EST LOW 20 MIN: CPT | Performed by: FAMILY MEDICINE

## 2024-06-21 NOTE — PATIENT INSTRUCTIONS
All adult screening ordered and done appropriate for patient's age and gender and risk factors and complaints.  Recommend weight loss via daily exercising and consistent healthy dietary changes.  Monitor blood pressures and record at home. Limit salt intake.  Medication reviewed and renewed where needed and appropriate.  Comply with medications.  Echo ordered as well.  Considering adding Metoprolol.

## 2024-06-22 NOTE — PROGRESS NOTES
Subjective:     Patient ID: Tad Montana is a 59 year old male.    This patient is a 59-year-old hypertensive/morbidly obese by definition -American male here for complete preventive care physical and for status update on any confirmed chronic medical illnesses and follow up on any previous labs or procedures that were suggestive or in need of further work up. Colonoscopy is due. Bowel and bladder functions are intact.    Patient denies headaches, chest pain, dizziness, shortness of breath, visual changes, exertional fatigue.    Patient's health concerns are heightened as he has lost some close family members, several of them in a row in the last few months.    Patient declines on Tdap and shingles vaccine offered today.              History/Other:   Review of Systems  Current Outpatient Medications   Medication Sig Dispense Refill    Olmesartan Medoxomil-HCTZ 40-25 MG Oral Tab Take 1 tablet by mouth every morning. 30 tablet 0    amLODIPine 5 MG Oral Tab Take 1 tablet (5 mg total) by mouth daily. 90 tablet 1    amLODIPine 10 MG Oral Tab Take 1 tablet (10 mg total) by mouth daily. 90 tablet 0    levocetirizine 5 MG Oral Tab Take 1 tablet (5 mg total) by mouth every evening. 90 tablet 1     Allergies:  Allergies   Allergen Reactions    Shellfish-Derived Products SWELLING       Past Medical History:    Hypertension      History reviewed. No pertinent surgical history.   History reviewed. No pertinent family history.   Social History:   Social History     Socioeconomic History    Marital status: Single   Tobacco Use    Smoking status: Never    Smokeless tobacco: Never   Vaping Use    Vaping status: Never Used   Substance and Sexual Activity    Alcohol use: Never    Drug use: Never        Objective:   Vitals:    06/21/24 1231   BP: (!) 154/96   Pulse:    Resp:    Temp:        Physical Exam  Constitutional:       Appearance: He is obese.   HENT:      Head: Normocephalic and atraumatic.      Right Ear: Tympanic  membrane normal.      Left Ear: Tympanic membrane normal.      Nose: Nose normal.      Mouth/Throat:      Mouth: Mucous membranes are moist.   Neck:      Thyroid: No thyromegaly.   Cardiovascular:      Rate and Rhythm: Normal rate and regular rhythm.   Pulmonary:      Breath sounds: Normal breath sounds.   Neurological:      Mental Status: He is alert.         Assessment & Plan:   1. Routine physical examination  The following has been ordered.  - CBC With Differential With Platelet; Future  - PSA Total, Screen; Future  - TSH W Reflex To Free T4; Future  - Comp Metabolic Panel (14); Future  - Lipid Panel; Future  - Urinalysis, Routine; Future  - PSA Total, Screen  - TSH W Reflex To Free T4  - Comp Metabolic Panel (14)  - Lipid Panel  - Urinalysis, Routine    2. Severe uncontrolled hypertension  Ordered.  - CT CALCIUM SCORING; Future  - CARD ECHO 2D DOPPLER (CPT=93306); Future    3. Tetanus, diphtheria, and acellular pertussis (Tdap) vaccination declined  Declined.  - TETANUS, DIPHTHERIA TOXOIDS AND ACELLULAR PERTUSIS VACCINE (TDAP), >7 YEARS, IM USE    4. Herpes zoster vaccination declined  Declined.  - Zoster Recombinant Adjuvanted (Shingrix -Shingles) [[id number]]    5. Colon cancer screening  Referred.  - Gastro Referral - [facility] ([place])    6. Class 3 severe obesity with serious comorbidity and body mass index (BMI) of 40.0 to 44.9 in adult, unspecified obesity type (HCC)  Recommend weight loss via daily exercising and consistent healthy dietary changes.    7. Heart murmur, systolic  Ordered.  - CARD ECHO 2D DOPPLER (CPT=93306); Future      Orders Placed This Encounter   Procedures    CBC With Differential With Platelet    PSA Total, Screen    TSH W Reflex To Free T4    Comp Metabolic Panel (14)    Lipid Panel    Urinalysis, Routine    TETANUS, DIPHTHERIA TOXOIDS AND ACELLULAR PERTUSIS VACCINE (TDAP), >7 YEARS, IM USE    Zoster Recombinant Adjuvanted (Shingrix -Shingles) [[id number]]       Meds This Visit:  Requested  Prescriptions      No prescriptions requested or ordered in this encounter       Imaging & Referrals:  TETANUS, DIPHTHERIA TOXOIDS AND ACELLULAR PERTUSIS VACCINE (TDAP), >7 YEARS, IM USE  ZOSTER VACC RECOMBINANT IM NJX  GASTRO - INTERNAL  CT CALCIUM SCORING  CARD ECHO 2D DOPPLER (CPT=93306)     Patient Instructions   All adult screening ordered and done appropriate for patient's age and gender and risk factors and complaints.  Recommend weight loss via daily exercising and consistent healthy dietary changes.  Monitor blood pressures and record at home. Limit salt intake.  Medication reviewed and renewed where needed and appropriate.  Comply with medications.  Echo ordered as well.  Considering adding Metoprolol.    Return in about 1 year (around 6/21/2025), or if symptoms worsen or fail to improve.

## 2024-07-20 DIAGNOSIS — I10 ESSENTIAL HYPERTENSION: ICD-10-CM

## 2024-07-20 DIAGNOSIS — R01.1 HEART MURMUR: ICD-10-CM

## 2024-07-24 RX ORDER — OLMESARTAN MEDOXOMIL AND HYDROCHLOROTHIAZIDE 40/25 40; 25 MG/1; MG/1
1 TABLET ORAL EVERY MORNING
Qty: 90 TABLET | Refills: 3 | Status: SHIPPED | OUTPATIENT
Start: 2024-07-24

## 2024-07-24 RX ORDER — AMLODIPINE BESYLATE 10 MG/1
10 TABLET ORAL DAILY
Qty: 90 TABLET | Refills: 3 | Status: SHIPPED | OUTPATIENT
Start: 2024-07-24

## 2024-07-24 NOTE — TELEPHONE ENCOUNTER
Please review. Protocol Failed; No Protocol    Requested Prescriptions   Pending Prescriptions Disp Refills    Olmesartan Medoxomil-HCTZ 40-25 MG Oral Tab 30 tablet 0     Sig: Take 1 tablet by mouth every morning.       Hypertension Medications Protocol Failed - 7/20/2024  8:07 PM        Failed - Last BP reading less than 140/90     BP Readings from Last 1 Encounters:   06/21/24 (!) 154/96               Passed - CMP or BMP in past 12 months        Passed - In person appointment or virtual visit in the past 12 mos or appointment in next 3 mos     Recent Outpatient Visits              1 month ago Routine physical examination    [facility] Ramon Keating, DO    Office Visit    1 year ago Essential hypertension    [facility] Ramon Keating, DO    Office Visit    1 year ago Colon cancer screening    [facility] Ramon Keating, DO    Office Visit    1 year ago Encounter for screening and preventative care    [facility] Ramon Keating,     Office Visit    3 years ago Plantar fascial fibromatosis of left foot    [facility] ([place] [place]Kala Scott Charles, MANJEET    Office Visit          Future Appointments         Provider Department Appt Notes    In 2 months Ramon Keating DO [facility] 3 month f/u                    Passed - EGFRCR or GFRAA > 50     GFR Evaluation  EGFRCR: 72 , resulted on 6/21/2024                 Future Appointments         Provider Department Appt Notes    In 2 months Ramon Keating DO [facility] 3 month f/u          Recent Outpatient Visits              1 month ago Routine physical examination    [facility] Ramon Keating, DO    Office Visit    1  year ago Essential hypertension    [facility] [place] [facility] Ramon Keating, DO    Office Visit    1 year ago Colon cancer screening    [facility] [place] [facility] Ramon Keating, DO    Office Visit    1 year ago Encounter for screening and preventative care    [facility] [place] [facility] Ramon Keating, DO    Office Visit    3 years ago Plantar fascial fibromatosis of left foot    [facility], Jacob Myers, MANJEET    Office Visit

## 2024-07-24 NOTE — TELEPHONE ENCOUNTER
Please review. Protocol Failed; No Protocol    Requested Prescriptions   Pending Prescriptions Disp Refills    Olmesartan Medoxomil-HCTZ 40-25 MG Oral Tab 30 tablet 0     Sig: Take 1 tablet by mouth every morning.       Hypertension Medications Protocol Failed - 7/20/2024  8:07 PM        Failed - Last BP reading less than 140/90     BP Readings from Last 1 Encounters:   06/21/24 (!) 154/96               Passed - CMP or BMP in past 12 months        Passed - In person appointment or virtual visit in the past 12 mos or appointment in next 3 mos     Recent Outpatient Visits              1 month ago Routine physical examination    [facility] Ramon Keating, DO    Office Visit    1 year ago Essential hypertension    [facility] Ramon Keating, DO    Office Visit    1 year ago Colon cancer screening    [facility] Ramon Keating, DO    Office Visit    1 year ago Encounter for screening and preventative care    [facility] Ramon Keating,     Office Visit    3 years ago [facility] fasc[facility]romatosis [facility] left [facility]    [facility]          Future Appointments         Provider Department Appt Notes    In 2 months Ramon Keating DO [facility] 3 month f/u                    Passed - EGFRCR or GFRAA > 50     GFR Evaluation  EGFRCR: 72 , resulted on 6/21/2024                 Future Appointments         Provider Department Appt Notes    In 2 months Ramon Keating DO [facility] 3 month f/u          Recent Outpatient Visits              1 month ago Routine physical examination    [facility] Ramon Keating, DO    Office Visit    1  year ago Essential hypertension    [facility] Ramon Keating, DO    Office Visit    1 year ago Colon cancer screening    [facility] Ramon Keating, DO    Office Visit    1 year ago Encounter for screening and preventative care    [facility] Ramon Keating, DO    Office Visit    3 years ago Plantar fascial fibromatosis of left foot    [facility], Jacob Myers, MANJEET    Office Visit

## 2024-07-24 NOTE — TELEPHONE ENCOUNTER
Please review. Protocol Failed; No Protocol    Requested Prescriptions   Pending Prescriptions Disp Refills    amLODIPine 10 MG Oral Tab 90 tablet 0     Sig: Take 1 tablet (10 mg total) by mouth daily.       Hypertension Medications Protocol Failed - 7/20/2024  8:06 PM        Failed - Last BP reading less than 140/90     BP Readings from Last 1 Encounters:   06/21/24 (!) 154/96               Passed - CMP or BMP in past 12 months        Passed - In person appointment or virtual visit in the past 12 mos or appointment in next 3 mos     Recent Outpatient Visits              1 month ago Routine physical examination    [facility] Ramon Keating, DO    Office Visit    1 year ago Essential hypertension    [facility] Ramon Keating, DO    Office Visit    1 year ago Colon cancer screening    [facility] Ramon Keating, DO    Office Visit    1 year ago Encounter for screening and preventative care    [facility] Ramon Keating,     Office Visit    3 years ago [facility]rom[facility]    [facility]          Future Appointments         Provider Department Appt Notes    In 2 months Ramon Keating DO [facility] 3 month f/u                    Passed - EGFRCR or GFRAA > 50     GFR Evaluation  EGFRCR: 72 , resulted on 6/21/2024                 Future Appointments         Provider Department Appt Notes    In 2 months Ramon Keating DO [facility] 3 month f/u          Recent Outpatient Visits              1 month ago Routine physical examination    [facility] Ramon Keating, DO    Office Visit    1 year ago  Essential hypertension    [facility] Ramon Keating, DO    Office Visit    1 year ago Colon cancer screening    [facility] Ramon Keating, DO    Office Visit    1 year ago Encounter for screening and preventative care    [facility] Ramon Keating, DO    Office Visit    3 years ago Plantar fascial fibromatosis of left foot    [facility]    Office Visit

## (undated) DIAGNOSIS — I10 ESSENTIAL HYPERTENSION: ICD-10-CM

## (undated) NOTE — LETTER
June 2, 2021         Lawyer Arnaud DO  [address] [facility]      Patient: Michelle Dia   YOB: 1965   Date of Visit: 6/2/2021       Dear Dr. Tiarra Morel,    I saw your patient, Michelle Dia, on 6/2/202